# Patient Record
Sex: FEMALE | Race: WHITE | NOT HISPANIC OR LATINO | URBAN - METROPOLITAN AREA
[De-identification: names, ages, dates, MRNs, and addresses within clinical notes are randomized per-mention and may not be internally consistent; named-entity substitution may affect disease eponyms.]

---

## 2022-10-20 DIAGNOSIS — J44.9 CHRONIC OBSTRUCTIVE PULMONARY DISEASE (COPD): Primary | ICD-10-CM

## 2022-11-07 ENCOUNTER — PROCEDURE VISIT (OUTPATIENT)
Dept: RESPIRATORY THERAPY | Facility: HOSPITAL | Age: 72
End: 2022-11-07
Attending: NURSE PRACTITIONER
Payer: MEDICARE

## 2022-11-07 DIAGNOSIS — J44.9 CHRONIC OBSTRUCTIVE PULMONARY DISEASE (COPD): ICD-10-CM

## 2022-11-07 PROCEDURE — 94060 EVALUATION OF WHEEZING: CPT

## 2022-11-07 PROCEDURE — 94727 GAS DIL/WSHOT DETER LNG VOL: CPT

## 2022-11-07 RX ORDER — ALBUTEROL SULFATE 0.83 MG/ML
2.5 SOLUTION RESPIRATORY (INHALATION)
Status: COMPLETED | OUTPATIENT
Start: 2022-11-07 | End: 2022-11-07

## 2022-11-07 RX ADMIN — ALBUTEROL SULFATE 2.5 MG: 0.83 SOLUTION RESPIRATORY (INHALATION) at 11:11

## 2022-11-07 NOTE — PROGRESS NOTES
Patient unable to complete DLCO maneuver after multiple attempts and coaching techniques.  Patient decided to stop testing even though measurements were not observed

## 2025-06-22 ENCOUNTER — HOSPITAL ENCOUNTER (EMERGENCY)
Facility: HOSPITAL | Age: 75
Discharge: HOME | End: 2025-06-22
Attending: EMERGENCY MEDICINE
Payer: MEDICARE

## 2025-06-22 VITALS
BODY MASS INDEX: 16.66 KG/M2 | WEIGHT: 100 LBS | RESPIRATION RATE: 20 BRPM | OXYGEN SATURATION: 92 % | HEART RATE: 98 BPM | SYSTOLIC BLOOD PRESSURE: 120 MMHG | HEIGHT: 65 IN | TEMPERATURE: 99 F | DIASTOLIC BLOOD PRESSURE: 69 MMHG

## 2025-06-22 DIAGNOSIS — R53.1 GENERAL WEAKNESS: ICD-10-CM

## 2025-06-22 DIAGNOSIS — R05.9 COUGH, UNSPECIFIED TYPE: ICD-10-CM

## 2025-06-22 DIAGNOSIS — J18.9 PNEUMONIA DUE TO INFECTIOUS ORGANISM, UNSPECIFIED LATERALITY, UNSPECIFIED PART OF LUNG: Primary | ICD-10-CM

## 2025-06-22 DIAGNOSIS — Z13.6 SCREENING FOR CARDIOVASCULAR CONDITION: ICD-10-CM

## 2025-06-22 DIAGNOSIS — J44.9 CHRONIC OBSTRUCTIVE PULMONARY DISEASE, UNSPECIFIED COPD TYPE: ICD-10-CM

## 2025-06-22 LAB
ABS NEUT (OLG): 11.25 X10(3)/MCL (ref 2.1–9.2)
ALBUMIN SERPL-MCNC: 2 G/DL (ref 3.4–4.8)
ALBUMIN/GLOB SERPL: 0.5 RATIO (ref 1.1–2)
ALP SERPL-CCNC: 87 UNIT/L (ref 40–150)
ALT SERPL-CCNC: 38 UNIT/L (ref 0–55)
ANION GAP SERPL CALC-SCNC: 8 MEQ/L
AST SERPL-CCNC: 21 UNIT/L (ref 11–45)
BILIRUB SERPL-MCNC: 0.4 MG/DL
BUN SERPL-MCNC: 16.1 MG/DL (ref 9.8–20.1)
CALCIUM SERPL-MCNC: 8.8 MG/DL (ref 8.4–10.2)
CHLORIDE SERPL-SCNC: 98 MMOL/L (ref 98–107)
CO2 SERPL-SCNC: 27 MMOL/L (ref 23–31)
CREAT SERPL-MCNC: 0.73 MG/DL (ref 0.55–1.02)
CREAT/UREA NIT SERPL: 22
ERYTHROCYTE [DISTWIDTH] IN BLOOD BY AUTOMATED COUNT: 15.1 % (ref 11.5–17)
GFR SERPLBLD CREATININE-BSD FMLA CKD-EPI: >60 ML/MIN/1.73/M2
GLOBULIN SER-MCNC: 4.3 GM/DL (ref 2.4–3.5)
GLUCOSE SERPL-MCNC: 118 MG/DL (ref 82–115)
HCT VFR BLD AUTO: 38.7 % (ref 37–47)
HGB BLD-MCNC: 12.4 G/DL (ref 12–16)
INSTRUMENT WBC (OLG): 12.36 X10(3)/MCL
LACTATE SERPL-SCNC: 0.8 MMOL/L (ref 0.5–2.2)
LYMPHOCYTES NFR BLD MANUAL: 0.37 X10(3)/MCL (ref 0.6–4.6)
LYMPHOCYTES NFR BLD MANUAL: 3 %
MCH RBC QN AUTO: 29.1 PG (ref 27–31)
MCHC RBC AUTO-ENTMCNC: 32 G/DL (ref 33–36)
MCV RBC AUTO: 90.8 FL (ref 80–94)
METAMYELOCYTES NFR BLD MANUAL: 1 %
MONOCYTES NFR BLD MANUAL: 0.62 X10(3)/MCL (ref 0.1–1.3)
MONOCYTES NFR BLD MANUAL: 5 %
NEUTROPHILS NFR BLD MANUAL: 91 %
PLATELET # BLD AUTO: 670 X10(3)/MCL (ref 130–400)
PLATELET # BLD EST: ABNORMAL 10*3/UL
PMV BLD AUTO: 9.1 FL (ref 7.4–10.4)
POTASSIUM SERPL-SCNC: 4.2 MMOL/L (ref 3.5–5.1)
PROT SERPL-MCNC: 6.3 GM/DL (ref 5.8–7.6)
RBC # BLD AUTO: 4.26 X10(6)/MCL (ref 4.2–5.4)
RBC MORPH BLD: NORMAL
SODIUM SERPL-SCNC: 133 MMOL/L (ref 136–145)
WBC # BLD AUTO: 12.36 X10(3)/MCL (ref 4.5–11.5)

## 2025-06-22 PROCEDURE — 87040 BLOOD CULTURE FOR BACTERIA: CPT

## 2025-06-22 PROCEDURE — 93010 ELECTROCARDIOGRAM REPORT: CPT | Mod: ,,, | Performed by: INTERNAL MEDICINE

## 2025-06-22 PROCEDURE — 85027 COMPLETE CBC AUTOMATED: CPT

## 2025-06-22 PROCEDURE — 93005 ELECTROCARDIOGRAM TRACING: CPT

## 2025-06-22 PROCEDURE — 83605 ASSAY OF LACTIC ACID: CPT

## 2025-06-22 PROCEDURE — 25000242 PHARM REV CODE 250 ALT 637 W/ HCPCS: Performed by: EMERGENCY MEDICINE

## 2025-06-22 PROCEDURE — 80053 COMPREHEN METABOLIC PANEL: CPT

## 2025-06-22 PROCEDURE — 99285 EMERGENCY DEPT VISIT HI MDM: CPT | Mod: 25

## 2025-06-22 RX ORDER — PROMETHAZINE HYDROCHLORIDE AND DEXTROMETHORPHAN HYDROBROMIDE 6.25; 15 MG/5ML; MG/5ML
5 SYRUP ORAL EVERY 6 HOURS PRN
Qty: 140 ML | Refills: 0 | Status: SHIPPED | OUTPATIENT
Start: 2025-06-22 | End: 2025-06-29

## 2025-06-22 RX ORDER — IPRATROPIUM BROMIDE AND ALBUTEROL SULFATE 2.5; .5 MG/3ML; MG/3ML
3 SOLUTION RESPIRATORY (INHALATION)
Status: COMPLETED | OUTPATIENT
Start: 2025-06-22 | End: 2025-06-22

## 2025-06-22 RX ORDER — DOXYCYCLINE 100 MG/1
100 CAPSULE ORAL 2 TIMES DAILY
Qty: 20 CAPSULE | Refills: 0 | Status: SHIPPED | OUTPATIENT
Start: 2025-06-22 | End: 2025-07-02

## 2025-06-22 RX ADMIN — IPRATROPIUM BROMIDE AND ALBUTEROL SULFATE 3 ML: .5; 3 SOLUTION RESPIRATORY (INHALATION) at 09:06

## 2025-06-23 LAB
OHS QRS DURATION: 84 MS
OHS QTC CALCULATION: 436 MS

## 2025-06-23 NOTE — ED PROVIDER NOTES
Encounter Date: 6/22/2025    SCRIBE #1 NOTE: I, En Nunez, am scribing for, and in the presence of,  Daniele Lynch MD. I have scribed the following portions of the note - Other sections scribed: HPI, ROS, PE.       History     Chief Complaint   Patient presents with    Shortness of Breath     Patient arrives with c/o fever and low SpO2% at home per family. Patient discharged from [facility] on Thursday for sepsis pneumonia. PMH COPD on 3L NC at home.      The patient is a 73 y/o female with a hx of COPD, anxiety, depression, and osteoporosis who presents to the ED for generalized weakness for the last few days. The patient reports she was discharged from [facility] on Thursday following a 3 day stay for pneumonia and sepsis. The patient states since discharge her symptoms have not resolved noting dyspnea on exertion, generalized weakness, leg swelling, and a fever of Tmax 100.7F. The patient endorses being discharged on cefdinir and being compliant with medications. She also notes she is on chronic supplemental oxygen at home being on 2.5 to 3 liters nasal canula.     The history is provided by the patient and medical records. No  was used.     Review of patient's allergies indicates:  No Known Allergies  No past medical history on file.  No past surgical history on file.  No family history on file.  Social History[1]  Review of Systems   Constitutional:  Positive for fatigue and fever. Negative for chills.   Respiratory:  Positive for shortness of breath. Negative for cough.    Cardiovascular:  Positive for leg swelling. Negative for chest pain.   Gastrointestinal:  Negative for abdominal pain, nausea and vomiting.   Musculoskeletal:  Negative for myalgias.       Physical Exam     Initial Vitals [06/22/25 1958]   BP Pulse Resp Temp SpO2   (!) 98/59 108 18 98.6 °F (37 °C) (!) 86 %      MAP       --         Physical Exam    Nursing note and vitals reviewed.  Constitutional:  She appears well-developed. No distress.   Thin appearing   HENT:   Head: Normocephalic and atraumatic.   Eyes: Conjunctivae are normal.   Cardiovascular:  Normal rate and intact distal pulses.           Pulmonary/Chest: No respiratory distress. She has decreased breath sounds. She has no rhonchi.   The patient is barrel chested and there are slightly diminished breath sounds bilaterally to auscultation.    Abdominal: Abdomen is soft. Bowel sounds are normal. There is no abdominal tenderness. There is no rebound and no guarding.   Musculoskeletal:         General: No edema.     Neurological: She is alert. She has normal strength.   Skin: Skin is warm and dry.   Psychiatric: She has a normal mood and affect.         ED Course   Procedures  Labs Reviewed   COMPREHENSIVE METABOLIC PANEL - Abnormal       Result Value    Sodium 133 (*)     Potassium 4.2      Chloride 98      CO2 27      Glucose 118 (*)     Blood Urea Nitrogen 16.1      Creatinine 0.73      Calcium 8.8      Protein Total 6.3      Albumin 2.0 (*)     Globulin 4.3 (*)     Albumin/Globulin Ratio 0.5 (*)     Bilirubin Total 0.4      ALP 87      ALT 38      AST 21      eGFR >60      Anion Gap 8.0      BUN/Creatinine Ratio 22     CBC WITH DIFFERENTIAL - Abnormal    WBC 12.36 (*)     RBC 4.26      Hgb 12.4      Hct 38.7      MCV 90.8      MCH 29.1      MCHC 32.0 (*)     RDW 15.1      Platelet 670 (*)     MPV 9.1     MANUAL DIFFERENTIAL - Abnormal    WBC 12.36      Neutrophils % 91      Lymphs % 3      Monocytes % 5      Metamyelocytes % 1 (*)     Neutrophils Abs 11.2476 (*)     Lymphs Abs 0.3708 (*)     Monocytes Abs 0.618      Platelets Increased (*)     RBC Morph Normal     LACTIC ACID, PLASMA - Normal    Lactic Acid Level 0.8     BLOOD CULTURE OLG   BLOOD CULTURE OLG   CBC W/ AUTO DIFFERENTIAL    Narrative:     The following orders were created for panel order CBC auto differential.  Procedure                               Abnormality         Status                      ---------                               -----------         ------                     CBC with Differential[[id number]]       Abnormal            Final result               Manual Differential[[id number]]         Abnormal            Final result                 Please view results for these tests on the individual orders.   URINALYSIS, REFLEX TO URINE CULTURE        ECG Results              EKG 12-lead (Preliminary result)  Result time 06/22/25 20:21:21      Wet Read by Daniele Lynch MD (06/22/25 20:21:21, [facility] - Emergency Dept, Emergency Medicine)    105 beats per minute.  EKG at 2006.  Sinus tachycardia no ST-elevation or depression.  Poor R-wave progression                                  Imaging Results              X-Ray Chest AP Portable (Final result)  Result time 06/22/25 20:34:13      Final result by Cecilio Barajas MD (06/22/25 20:34:13)                   Impression:      Increased density on the right most consistent with pneumonia with small bilateral pleural effusions      Electronically signed by: Cecilio Barajas MD  Date:    06/22/2025  Time:    20:34               Narrative:    EXAMINATION:  XR CHEST AP PORTABLE    CLINICAL HISTORY:  Sepsis;    TECHNIQUE:  Single frontal view of the chest was performed.    COMPARISON:  None    FINDINGS:  There is increased density on the right cannot rule out pneumonia.  There is blunting of both costophrenic angles cannot rule out small effusions.  Heart size is within normal limits.  There is vascular calcification noted.                                       Medications   albuterol-ipratropium 2.5 mg-0.5 mg/3 mL nebulizer solution 3 mL (3 mLs Nebulization Given 6/22/25 2114)     Medical Decision Making  Differential diagnosis includes but is not limited to: pneumonia, COPD exacerbation, hypoxia, generalized weakness, dehydration, sepsis    Discussed the case with the patient and her family member at bedside.  That has states  she is still that has feels fatigued and tired.  This is expected given her recent admission with pneumonia and sepsis.  She states her home 2-1/2-3 L of oxygen has been keeping her oxygen in the low to mid 90s which is her baseline.  She is not have any pain anywhere including no chest pain shortness of breath still has a cough it has been controlled with the current promethazine dextromethorphan cough syrup but states she is almost out of it.  She is supposed to be following with the PCP but her PCP is out for maternity leave will not be back for few weeks.  They state whenever she is initially admitted she is at running very high fevers up to 102 she has has a few fevers of 100.7 but states it always comes down with Tylenol.  State they were concerned because they do not feel thinks were explained very clearly to them about what she should expect going forward and she is not able to go see your PCP at this time.  I discussed I would expect her to feel weak fatigued and somewhat poorly after a series diagnosis.  She is afebrile here does have a mild leukocytosis chest x-ray shows infiltrate which we know about.  Her sats are good lung sounds are reassuring on exam.  She is currently taking cefdinir 300 mg b.i.d. still on that medication.  I will add doxycycline to that and refill her cough medication.  She states she feels okay in his comfortable going home at this time family is comfortable with that as well.  I did give her strict return precautions if she has fever in his not improving with Tylenol or Motrin or if you feel short of breath or just feels worse and not better in the next few days I do want her to return promptly for repeat evaluation they expressed understanding    Problems Addressed:  Chronic obstructive pulmonary disease, unspecified COPD type: acute illness or injury  General weakness: acute illness or injury  Pneumonia due to infectious organism, unspecified laterality, unspecified part of  lung: acute illness or injury    Amount and/or Complexity of Data Reviewed  Labs: ordered.  Radiology: ordered.  ECG/medicine tests: ordered.    Risk  Prescription drug management.            Scribe Attestation:   Scribe #1: I performed the above scribed service and the documentation accurately describes the services I performed. I attest to the accuracy of the note.    Attending Attestation:           Physician Attestation for Scribe:  Physician Attestation Statement for Scribe #1: I, Daniele Lynch MD, reviewed documentation, as scribed by En Nunez in my presence, and it is both accurate and complete.                                    Clinical Impression:  Final diagnoses:  [Z13.6] Screening for cardiovascular condition  [J18.9] Pneumonia due to infectious organism, unspecified laterality, unspecified part of lung (Primary)  [J44.9] Chronic obstructive pulmonary disease, unspecified COPD type  [R05.9] Cough, unspecified type  [R53.1] General weakness          ED Disposition Condition    Discharge Stable          ED Prescriptions       Medication Sig Dispense Start Date End Date Auth. Provider    doxycycline (VIBRAMYCIN) 100 MG Cap Take 1 capsule (100 mg total) by mouth 2 (two) times daily. for 10 days 20 capsule 6/22/2025 7/2/2025 Daniele Lynch MD    promethazine-dextromethorphan (PROMETHAZINE-DM) 6.25-15 mg/5 mL Syrp Take 5 mLs by mouth every 6 (six) hours as needed. 140 mL 6/22/2025 6/29/2025 Daniele Lynch MD          Follow-up Information       Follow up With Specialties Details Why Contact Info    Shweta Ken NP Family Medicine Schedule an appointment as soon as possible for a visit   [address]   [facility] [address]  [phone]                     [1]         Daniele Lynch MD  06/22/25 [id number]

## 2025-06-23 NOTE — DISCHARGE INSTRUCTIONS
Use acetaminophen and/or ibuprofen as needed for fever or pain.  Continue your current medications and take the new antibiotic as well.  If your fever is not improving stays over 100.4 or you feel worse at all please return to emergency room right away.  If you feel short of breath weak confused or feeling worse rather than better police return right away.  We have obtained blood cultures here today if they are abnormal we will call you and have you return

## 2025-06-23 NOTE — FIRST PROVIDER EVALUATION
Medical screening examination initiated.  I have conducted a focused provider triage encounter, findings are as follows:    Brief history of present illness:  74 year old patient presents to the ED c/o fever and low oxygen. Patient recently d/c from [facility] on Thursday with pneumonia and sepsis. Patient is on home o2 at 3L. Sister at bedside and states that neighbor did take patient o2 while on oxygen and admits that o2 level was 88. Sister also admits to 102 fever at home.     There were no vitals filed for this visit.    Pertinent physical exam:  alert    Brief workup plan:  sepsis workup    Preliminary workup initiated; this workup will be continued and followed by the physician or advanced practice provider that is assigned to the patient when roomed.

## 2025-06-27 LAB
BACTERIA BLD CULT: NORMAL
BACTERIA BLD CULT: NORMAL